# Patient Record
Sex: FEMALE | Race: ASIAN | NOT HISPANIC OR LATINO
[De-identification: names, ages, dates, MRNs, and addresses within clinical notes are randomized per-mention and may not be internally consistent; named-entity substitution may affect disease eponyms.]

---

## 2018-01-10 ENCOUNTER — RECORDS - HEALTHEAST (OUTPATIENT)
Dept: ADMINISTRATIVE | Facility: OTHER | Age: 33
End: 2018-01-10

## 2018-01-11 ENCOUNTER — COMMUNICATION - HEALTHEAST (OUTPATIENT)
Dept: ADMINISTRATIVE | Facility: CLINIC | Age: 33
End: 2018-01-11

## 2018-01-11 ENCOUNTER — RECORDS - HEALTHEAST (OUTPATIENT)
Dept: ADMINISTRATIVE | Facility: OTHER | Age: 33
End: 2018-01-11

## 2018-01-22 ENCOUNTER — OFFICE VISIT - HEALTHEAST (OUTPATIENT)
Dept: EDUCATION SERVICES | Facility: CLINIC | Age: 33
End: 2018-01-22

## 2018-01-22 DIAGNOSIS — O24.410 DIET CONTROLLED GESTATIONAL DIABETES MELLITUS (GDM) IN SECOND TRIMESTER: ICD-10-CM

## 2018-01-30 ENCOUNTER — AMBULATORY - HEALTHEAST (OUTPATIENT)
Dept: EDUCATION SERVICES | Facility: CLINIC | Age: 33
End: 2018-01-30

## 2018-01-30 DIAGNOSIS — O24.410 DIET CONTROLLED GESTATIONAL DIABETES MELLITUS (GDM) IN SECOND TRIMESTER: ICD-10-CM

## 2018-04-11 ENCOUNTER — COMMUNICATION - HEALTHEAST (OUTPATIENT)
Dept: ENDOCRINOLOGY | Facility: CLINIC | Age: 33
End: 2018-04-11

## 2018-04-11 DIAGNOSIS — E11.9 DIABETES (H): ICD-10-CM

## 2018-12-14 ENCOUNTER — TELEPHONE (OUTPATIENT)
Dept: OTHER | Facility: CLINIC | Age: 33
End: 2018-12-14

## 2018-12-14 NOTE — TELEPHONE ENCOUNTER
12/14/2018    Call Regarding Onboarding P1 Other    Attempt 1    Message on voicemail     Comments:       Outreach   CWR

## 2018-12-31 NOTE — TELEPHONE ENCOUNTER
12/31/2018    Call Regarding Onboarding P1 Other    Attempt 2    Message on voicemail     Comments:       Outreach   CWR

## 2020-09-14 ENCOUNTER — COMMUNICATION - HEALTHEAST (OUTPATIENT)
Dept: SCHEDULING | Facility: CLINIC | Age: 35
End: 2020-09-14

## 2020-09-18 ENCOUNTER — COMMUNICATION - HEALTHEAST (OUTPATIENT)
Dept: CARE COORDINATION | Facility: CLINIC | Age: 35
End: 2020-09-18

## 2020-09-18 DIAGNOSIS — N12 PYELONEPHRITIS: ICD-10-CM

## 2020-09-21 ENCOUNTER — AMBULATORY - HEALTHEAST (OUTPATIENT)
Dept: SCHEDULING | Facility: CLINIC | Age: 35
End: 2020-09-21

## 2020-09-21 ENCOUNTER — COMMUNICATION - HEALTHEAST (OUTPATIENT)
Dept: INFECTIOUS DISEASES | Facility: CLINIC | Age: 35
End: 2020-09-21

## 2020-09-21 DIAGNOSIS — N10: ICD-10-CM

## 2020-09-21 DIAGNOSIS — N12 PYELONEPHRITIS: ICD-10-CM

## 2020-10-05 ENCOUNTER — COMMUNICATION - HEALTHEAST (OUTPATIENT)
Dept: CARE COORDINATION | Facility: CLINIC | Age: 35
End: 2020-10-05

## 2021-05-31 VITALS — WEIGHT: 180.7 LBS

## 2021-05-31 VITALS — WEIGHT: 180 LBS

## 2021-06-11 NOTE — TELEPHONE ENCOUNTER
Pt on meropenem. [facility] pharmacist Tatiana informs pt called letting them know she started her afternoon dose around 3 today, started feeling burning sensation in vein when infusing abx. After 10 minute of infusion pt stopped and has clamped the line. No issues with previous doses Calling for advise on treatment.     Sent text page to Dr Thompson

## 2021-06-11 NOTE — TELEPHONE ENCOUNTER
Called I to inquire if they have everything needed. Prabha states RN Mena will try to to arrange for home nurse to set up peripheral so pt won't miss a done until midline can be replace. They are also calling to schedule replacement for pt.

## 2021-06-11 NOTE — TELEPHONE ENCOUNTER
JUAN LUIS calling back stating the pt does not want a line replaced and wondering if there is an oral option or if she can ave a peripheral IV for abx until the end date of 9/27. I informed I will speak to the MD and contact them back.

## 2021-06-11 NOTE — TELEPHONE ENCOUNTER
Levaquin 500 mg 1 p.o. daily x 5 days sent ERX, per Dr. Thompson. I called and informed [facility]

## 2021-06-11 NOTE — TELEPHONE ENCOUNTER
[facility] calling back.    States that midline error, fluid coming out around it.      They can be reached @ [phone], can ask for  toña/sai? or justice

## 2021-06-11 NOTE — TELEPHONE ENCOUNTER
Returned call from [facility].    Patient having issues with midline--OK to replace midline.    Ramin Thompson MD

## 2021-06-11 NOTE — PROGRESS NOTES
Chart Reviewed by Clinical Product Navigator; patient identified on recently discharged report.     Meets criteria for referral to Care Coordination; referral sent.     Macey Lema RN  Clinical Product Navigator

## 2021-06-12 NOTE — PROGRESS NOTES
Clinic Care Coordination Contact  [facility]/Voicemail       Clinical Data: Care Coordinator Outreach  Outreach attempted x 1.  No answer and unable to leave a VM.  Plan:  Care Coordinator will try to reach patient again in 1-2 business days.

## 2021-06-12 NOTE — PROGRESS NOTES
Clinic Care Coordination Contact  [facility]       Clinical Data: Care Coordinator Outreach  Outreach attempted x 1.   Plan: Care Coordinator will send care coordination introduction letter with care coordinator contact information and explanation of care coordination services via mail. Care Coordinator will do no further outreaches at this time.

## 2021-06-15 NOTE — PROGRESS NOTES
[facility] GDM Care Plan    Assessment: pt seen today for initial visit. Mother is diabetic on insulin, pt did a DPP through her work as well as she knows she is at risk for T2DM.   BG in clinic was 156 mg/dl a little less than 1 hour after breakfast of about 1 cup of rice. Will try 2/3 cup of rice for breakfast and walking if able.     Plan:f/u next week as scheduled     Provider: Micheal   Provider's Diagnosis (per referral form): Gestational (648.83)  Weight: 180 lb (81.6 kg)  1 hour OGTT: 219  EDC: 5/11/18  Pregnancy #: 9  Previous GDM: No - pt notes she has failed the 1hr before, but not >200. She has always passed the 3hr.   Medications:   Current Outpatient Prescriptions:      acetone, urine, test (ACETONE, URINE, TEST) Strp, Use 1 each As Directed every morning., Disp: 50 strip, Rfl: 3     blood glucose test (CONTOUR NEXT STRIPS) strips, Use 1 each As Directed 4 (four) times a day., Disp: 150 strip, Rfl: 4     generic lancets, Use 1 each As Directed 4 (four) times a day., Disp: 100 each, Rfl: 3    BG monitoring goals: Fasting <95; 1 hour post start of meal <140. Test 4 x per day.  Check fasting a.m. ketones: Yes  GDM meal pattern/carb counting taught per guidelines: Yes  Goals: Nutrition: GDM meal plan  Activity:  Walking after meals when able/staying active  Monitoring:  BG 4x/day as directed, ketones every morning    F/u in 1 week to assess BG and food log     DIABETES CARE PLAN AND EDUCATION RECORD    Gestational Diabetes Disease Process/Preconception Care/Management During Pregnancy/Postpartum:    Meter (per above goals): Discussed, Literature provided and Patient returned demonstration    Nutrition Management    Weight: Assessed, Discussed and Literature provided  Portions/Balance: Assessed, Discussed and Literature provided  Carb ID/Count: Assessed, Discussed and Literature provided  Label Reading: Assessed, Discussed and Literature provided  Menu Planning: Assessed, Discussed and Literature  provided  Dining Out: Assessed, Discussed and Literature provided  Physical Activity: Discussed and Literature provided    Acute Complications: Prevent, Detect, Treat:    Goal Setting and Problem Solving: Assessed and Discussed  Barriers: Assessed and Discussed  Psychosocial Adjustments: Assessed and Discussed      Time: 60  Visit Type: GDM Initial   Visit #: 1

## 2021-06-15 NOTE — PROGRESS NOTES
[facility] GDM Care Plan    Assessment: pt seen today for f/u. She brings in detailed food and BG log.   B: 1 elevation at 141 with >30g and regular coke   L: no elevations  D: 3 elevations at 145, 165 ad 219 (sticky rice)  Elevations are explainable with sticky rice, soda and > CHO then meal plan. Pt states she is working on this. Will continue to work on it. Discussed diet changes, and adding snacks so she is not so hungry at the meals. Ketones are normal.   If elevations continue will start insulin at next visit, she is aware of this.   FBG elevated X1.       Visit Type: GDM Individual Follow-up  Time: 30  Visit #: 2  Provider: Micheal  Provider's Diagnosis (per referral form): Gestational (648.83)  Weight: 180 lb 11.2 oz (82 kg)  OGTT: 1 hour 219  Estimated Date of Delivery: 5/11/18  Pregnancy #: 9  Previous GDM: No - pt notes she has failed the 1hr before, but not >200. She has always passed the 3hr.  Medications:   Current Outpatient Prescriptions:      acetone, urine, test (ACETONE, URINE, TEST) Strp, Use 1 each As Directed every morning., Disp: 50 strip, Rfl: 3     blood glucose test (CONTOUR NEXT STRIPS) strips, Use 1 each As Directed 4 (four) times a day., Disp: 150 strip, Rfl: 4     generic lancets, Use 1 each As Directed 4 (four) times a day., Disp: 100 each, Rfl: 3    BG monitoring goals: Fasting <95; 1 hour post start of meal <140. Test 4 x per day.  Check fasting a.m. ketones: Yes  GDM meal pattern/carb counting taught per guidelines: Yes    Past Goals:  Nutrition: GDM meal plan MOSTLY MET  Activity: Walking after meals when able/staying active MET  Monitoring: BG 4x/day as directed, ketones every morning MET      New Goals:  Nutrition: GDM meal plan   Activity: Walking after meals when able/staying active   Monitoring: BG 4x/day as directed, ketones every morning    DIABETES CARE PLAN AND EDUCATION RECORD    Gestational Diabetes Disease Process/Preconception Care/Management During  Pregnancy/Postpartum:Discussed    Meter (per above goals): Discussed    Nutrition Management    Weight: Assessed and Discussed  Portions/Balance: Assessed and Discussed  Carb ID/Count: Assessed and Discussed  Label Reading: Assessed and Discussed  Menu Planning: Assessed and Discussed  Dining Out: Assessed and Discussed  Physical Activity: Assessed and Discussed    Acute Complications: Prevent, Detect, Treat:    Goal Setting and Problem Solving: Assessed and Discussed  Barriers: Assessed and Discussed  Psychosocial Adjustments: Assessed and Discussed

## 2021-06-16 PROBLEM — I10 HYPERTENSION: Status: ACTIVE | Noted: 2018-04-11

## 2021-06-16 PROBLEM — N83.201 CYST OF RIGHT OVARY: Status: ACTIVE | Noted: 2020-09-13

## 2021-06-16 PROBLEM — N12 PYELONEPHRITIS: Status: ACTIVE | Noted: 2020-09-12

## 2021-06-16 PROBLEM — R50.9 FEVER AND CHILLS: Status: ACTIVE | Noted: 2020-09-12

## 2021-06-16 PROBLEM — N26.1 ATROPHY OF LEFT KIDNEY: Status: ACTIVE | Noted: 2020-09-13

## 2021-06-16 PROBLEM — E27.8 RIGHT ADRENAL MASS (H): Status: ACTIVE | Noted: 2020-09-13

## 2021-06-20 NOTE — LETTER
Letter by Sandrita Alvarez RN at      Author: Sandrita Alvarez RN Service: -- Author Type: --    Filed:  Encounter Date: 10/5/2020 Status: (Other)       CARE COORDINATION    October 7, 2020    Say Hailey [address] [facility] [id number]      Dear Say,    I am a clinic care coordinator at [facility]. I have been trying to reach you recently to introduce Clinic Care Coordination and to see if there was anything I could assist you with.  Below is a description of clinic care coordination and how I can further assist you.      The clinic care coordination team is made up of a registered nurse,  and community health worker who understand the health care system. The goal of clinic care coordination is to help you manage your health and improve access to the health care system in the most efficient manner. The team can assist you in meeting your health care goals by providing education, coordinating services, strengthening the communication among your providers and supporting you with any resource needs.    Please feel free to contact the Community Health Worker Radha De La Cruz at [phone] with any questions or concerns. We are focused on providing you with the highest-quality healthcare experience possible and that all starts with you.     Sincerely,     Sandrita Alvarez RN

## 2021-07-03 NOTE — ADDENDUM NOTE
Addendum Note by Jossie Tejada RN at 9/22/2020 12:20 PM     Author: Jossie Tejada RN Service: -- Author Type: Registered Nurse    Filed: 9/22/2020 12:20 PM Encounter Date: 9/21/2020 Status: Signed    : Jossie Tejada RN (Registered Nurse)    Addended by: JOSSIE TEJADA on: 9/22/2020 12:20 PM        Modules accepted: Orders